# Patient Record
Sex: MALE | URBAN - METROPOLITAN AREA
[De-identification: names, ages, dates, MRNs, and addresses within clinical notes are randomized per-mention and may not be internally consistent; named-entity substitution may affect disease eponyms.]

---

## 2020-11-01 ENCOUNTER — APPOINTMENT (OUTPATIENT)
Dept: CT IMAGING | Age: 80
End: 2020-11-01
Attending: EMERGENCY MEDICINE

## 2020-11-01 ENCOUNTER — HOSPITAL ENCOUNTER (EMERGENCY)
Age: 80
Discharge: STILL ADMITTED OR ADMINISTRATIVE | End: 2020-11-02
Attending: EMERGENCY MEDICINE

## 2020-11-01 VITALS
HEART RATE: 80 BPM | OXYGEN SATURATION: 100 % | TEMPERATURE: 97.6 F | DIASTOLIC BLOOD PRESSURE: 89 MMHG | BODY MASS INDEX: 25.84 KG/M2 | RESPIRATION RATE: 18 BRPM | WEIGHT: 180.5 LBS | HEIGHT: 70 IN | SYSTOLIC BLOOD PRESSURE: 211 MMHG

## 2020-11-01 PROCEDURE — 99282 EMERGENCY DEPT VISIT SF MDM: CPT

## 2020-11-01 PROCEDURE — 70450 CT HEAD/BRAIN W/O DYE: CPT

## 2020-11-01 NOTE — ED PROVIDER NOTES
Altered mental status      Fall          No past medical history on file. No past surgical history on file. No family history on file. Social History     Socioeconomic History    Marital status: Not on file     Spouse name: Not on file    Number of children: Not on file    Years of education: Not on file    Highest education level: Not on file   Occupational History    Not on file   Social Needs    Financial resource strain: Not on file    Food insecurity     Worry: Not on file     Inability: Not on file    Transportation needs     Medical: Not on file     Non-medical: Not on file   Tobacco Use    Smoking status: Not on file   Substance and Sexual Activity    Alcohol use: Not on file    Drug use: Not on file    Sexual activity: Not on file   Lifestyle    Physical activity     Days per week: Not on file     Minutes per session: Not on file    Stress: Not on file   Relationships    Social connections     Talks on phone: Not on file     Gets together: Not on file     Attends Baptism service: Not on file     Active member of club or organization: Not on file     Attends meetings of clubs or organizations: Not on file     Relationship status: Not on file    Intimate partner violence     Fear of current or ex partner: Not on file     Emotionally abused: Not on file     Physically abused: Not on file     Forced sexual activity: Not on file   Other Topics Concern    Not on file   Social History Narrative    Not on file         ALLERGIES: Patient has no allergy information on record. Review of Systems    There were no vitals filed for this visit. Physical Exam     MDM  ED Course as of Nov 01 1912   Jenny Johnson Nov 01, 2020   1849 Spoke to Dr. Ben Sykes with teleneurology. He will evaluate patient. We are waiting on the wife as we do not have any information about past medical history or medications. [TT]   1903 Stroke scale NIH 1 per Dr. Ben Sykes. Requests CTA head and neck. No tPA. [TT]      ED Course User Index  [TT] Kris Cortez MD       Procedures

## 2020-11-01 NOTE — ED NOTES
Patient speaking clear sentences with increased clarity. Patient alert to self, disoriented to time, place, and situation. Neurologist on telemonitor.

## 2020-11-01 NOTE — ED TRIAGE NOTES
Patient arrives from home via EMS with complaint of altered mental status, left sided weakness, and dysphasia speaking with word salad. Per EMS: Last known well [id number] today. Patient reportedly suffered fall at home, family heard the patient fall and found the patient with current symptoms. Per EMS: blood glucose 131. Status of patient taking blood thinning medication unknown. Patient alert to first name, disoriented to time, place and situation. Patient has hx of stroke. Dr. Colette Hyman met patient in [place].